# Patient Record
Sex: FEMALE | Race: WHITE | NOT HISPANIC OR LATINO | Employment: OTHER | URBAN - METROPOLITAN AREA
[De-identification: names, ages, dates, MRNs, and addresses within clinical notes are randomized per-mention and may not be internally consistent; named-entity substitution may affect disease eponyms.]

---

## 2017-06-10 ENCOUNTER — HOSPITAL ENCOUNTER (EMERGENCY)
Facility: MEDICAL CENTER | Age: 82
End: 2017-06-10
Attending: EMERGENCY MEDICINE
Payer: COMMERCIAL

## 2017-06-10 VITALS
HEART RATE: 58 BPM | DIASTOLIC BLOOD PRESSURE: 64 MMHG | TEMPERATURE: 98 F | SYSTOLIC BLOOD PRESSURE: 136 MMHG | WEIGHT: 169.75 LBS | BODY MASS INDEX: 28.98 KG/M2 | OXYGEN SATURATION: 97 % | HEIGHT: 64 IN | RESPIRATION RATE: 16 BRPM

## 2017-06-10 DIAGNOSIS — S05.01XA CORNEAL ABRASION, RIGHT, INITIAL ENCOUNTER: ICD-10-CM

## 2017-06-10 PROCEDURE — 99284 EMERGENCY DEPT VISIT MOD MDM: CPT

## 2017-06-10 PROCEDURE — 65205 REMOVE FOREIGN BODY FROM EYE: CPT

## 2017-06-10 PROCEDURE — 700101 HCHG RX REV CODE 250

## 2017-06-10 RX ORDER — GENTAMICIN SULFATE 3 MG/ML
1 SOLUTION/ DROPS OPHTHALMIC EVERY 4 HOURS
Qty: 1 BOTTLE | Refills: 0 | Status: SHIPPED | OUTPATIENT
Start: 2017-06-10 | End: 2017-06-17

## 2017-06-10 RX ORDER — PROPARACAINE HYDROCHLORIDE 5 MG/ML
1 SOLUTION/ DROPS OPHTHALMIC ONCE
Status: DISCONTINUED | OUTPATIENT
Start: 2017-06-10 | End: 2017-06-10 | Stop reason: HOSPADM

## 2017-06-10 RX ORDER — ERYTHROMYCIN 5 MG/G
OINTMENT OPHTHALMIC
Status: COMPLETED
Start: 2017-06-10 | End: 2017-06-10

## 2017-06-10 RX ORDER — TETRACAINE HYDROCHLORIDE 5 MG/ML
SOLUTION OPHTHALMIC
Status: COMPLETED
Start: 2017-06-10 | End: 2017-06-10

## 2017-06-10 RX ADMIN — ERYTHROMYCIN: 5 OINTMENT OPHTHALMIC at 14:30

## 2017-06-10 RX ADMIN — TETRACAINE HYDROCHLORIDE: 5 SOLUTION OPHTHALMIC at 14:30

## 2017-06-10 ASSESSMENT — PAIN SCALES - GENERAL: PAINLEVEL_OUTOF10: 4

## 2017-06-10 NOTE — DISCHARGE INSTRUCTIONS
Use the gentamicin eyedrops during the daytime and the erythromycin cream at bedtime. Call your eye doctor 1st thing Monday morning and arrange office recheck as soon as possible during the week. If you have new or worsening symptoms go to [facility] emergency department on [address] because we do not have an ophthalmologist on call at this hospital

## 2017-06-10 NOTE — ED AVS SNAPSHOT
[employer] Access Code: [email or web address]  Expires: 7/10/2017  2:23 PM    Your email address is not on file at [facility].  Email Addresses are required for you to sign up for [employer], please contact [phone] to verify your personal information and to provide your email address prior to attempting to register for [employer].    Nicko Denisse Dona  [address] [place]    [place] [address]    [employer]  A secure, online tool to manage your health information     [facility] [employer]® is a secure, online tool that connects you to your personalized health information from the privacy of your home -- day or night - making it very easy for you to manage your healthcare. Once the activation process is completed, you can even access your medical information using the [employer] ishmael, which is available for free in the Apple Ishmael store or Google Play store.     To learn more about [employer], visit [email or web address]    There are two levels of access available (as shown below):   My Chart Features  [facility] Primary Care Doctor [facility]  Specialists [facility]  Urgent  Care Non-[facility] Primary Care Doctor   Email your healthcare team securely and privately 24/7 X X X    Manage appointments: schedule your next appointment; view details of past/upcoming appointments X      Request prescription refills. X      View recent personal medical records, including lab and immunizations X X X X   View health record, including health history, allergies, medications X X X X   Read reports about your outpatient visits, procedures, consult and ER notes X X X X   See your discharge summary, which is a recap of your hospital and/or ER visit that includes your diagnosis, lab results, and care plan X X  X     How to register for [employer]:  Once your e-mail address has been verified, follow the following steps to sign up for [employer].     1. Go to  [email or web address]  2. Click on the Sign Up Now box, which takes you to the New Member Sign Up page. You  will need to provide the following information:  a. Enter your [employer] Access Code exactly as it appears at the top of this page. (You will not need to use this code after you’ve completed the sign-up process. If you do not sign up before the expiration date, you must request a new code.)   b. Enter your date of birth.   c. Enter your home email address.   d. Click Submit, and follow the next screen’s instructions.  3. Create a [employer] ID. This will be your [employer] login ID and cannot be changed, so think of one that is secure and easy to remember.  4. Create a [employer] password. You can change your password at any time.  5. Enter your Password Reset Question and Answer. This can be used at a later time if you forget your password.   6. Enter your e-mail address. This allows you to receive e-mail notifications when new information is available in [employer].  7. Click Sign Up. You can now view your health information.    For assistance activating your [employer] account, call [phone]

## 2017-06-10 NOTE — ED AVS SNAPSHOT
Home Care Instructions                                                                                                                Nicko Carcamo   MRN: [id number]    Department:  [facility], Emergency Dept   Date of Visit:  6/10/2017            [facility], Emergency Dept    [address]    [place] [address]    Phone:  [phone]      You were seen by     Mohinder Luna M.D.      Your Diagnosis Was     Corneal abrasion, right, initial encounter     S05.01XA       Medication Information     Review all of your home medications and newly ordered medications with your primary doctor and/or pharmacist as soon as possible. Follow medication instructions as directed by your doctor and/or pharmacist.     Please keep your complete medication list with you and share with your physician. Update the information when medications are discontinued, doses are changed, or new medications (including over-the-counter products) are added; and carry medication information at all times in the event of emergency situations.               Medication List      START taking these medications        Instructions    Morning Afternoon Evening Bedtime    gentamicin 0.3 % Soln   Commonly known as:  GARAMYCIN        Place 1 Drop in right eye every 4 hours for 7 days.   Dose:  1 Drop                          ASK your doctor about these medications        Instructions    Morning Afternoon Evening Bedtime    alprazolam 0.25 MG Tabs   Commonly known as:  XANAX        Take 0.25 mg by mouth at bedtime as needed for Sleep.   Dose:  0.25 mg                        amlodipine 2.5 MG Tabs   Commonly known as:  NORVASC        Take 2.5 mg by mouth every day.   Dose:  2.5 mg                        aspirin 81 MG Chew chewable tablet   Commonly known as:  ASA        Take 81 mg by mouth every day.   Dose:  81 mg                        azithromycin 250 MG Tabs   Commonly known as:  ZITHROMAX        Use  as directed                        ezetimibe 10 MG Tabs   Commonly known as:  ZETIA        Take 10 mg by mouth every day.   Dose:  10 mg                        ferric gluconate complex 12.5 MG/ML Soln   Commonly known as:  FERRLECIT        62.5 mg by Intravenous route Once.   Dose:  62.5 mg                        glipiZIDE 10 MG Tabs   Commonly known as:  GLUCOTROL        Take 10 mg by mouth 2 times a day.   Dose:  10 mg                        losartan 25 MG Tabs   Commonly known as:  COZAAR        Take 25 mg by mouth every day.   Dose:  25 mg                        metoprolol SR 50 MG Tb24   Commonly known as:  TOPROL XL        Take 50 mg by mouth 2 Times a Day.   Dose:  50 mg                        sitagliptin 100 MG Tabs   Commonly known as:  JANUVIA        Take 100 mg by mouth every day.   Dose:  100 mg                        SPIRONOLACTONE PO        Take 12.5 mg by mouth.   Dose:  12.5 mg                             Where to Get Your Medications      You can get these medications from any pharmacy     Bring a paper prescription for each of these medications    - gentamicin 0.3 % Soln              Discharge Instructions       Use the gentamicin eyedrops during the daytime and the erythromycin cream at bedtime. Call your eye doctor 1st thing Monday morning and arrange office recheck as soon as possible during the week. If you have new or worsening symptoms go to [facility] emergency department on [address] because we do not have an ophthalmologist on call at this hospital          Patient Information     Patient Information    Following emergency treatment: all patient requiring follow-up care must return either to a private physician or a clinic if your condition worsens before you are able to obtain further medical attention, please return to the emergency room.     Billing Information    At [facility], we work to make the billing process streamlined for our patients.  Our  Representatives are here to answer any questions you may have regarding your hospital bill.  If you have insurance coverage and have supplied your insurance information to us, we will submit a claim to your insurer on your behalf.  Should you have any questions regarding your bill, we can be reached online or by phone as follows:  Online: You are able pay your bills online or live chat with our representatives about any billing questions you may have. We are here to help Monday - Friday from 8:00am to 7:30pm and 9:00am - 12:00pm on Saturdays.  Please visit [email or web address]  for more information.   Phone:  [phone] or [phone]    Please note that your emergency physician, surgeon, pathologist, radiologist, anesthesiologist, and other specialists are not employed by [facility] and will therefore bill separately for their services.  Please contact them directly for any questions concerning their bills at the numbers below:     Emergency Physician Services:  [phone]  [facility]:  [phone]  [facility]:  [phone]  [facility]:  [phone]    1. Your final bill may vary from the amount quoted upon discharge if all procedures are not complete at that time, or if your doctor has additional procedures of which we are not aware. You will receive an additional bill if you return to the Emergency Department at [facility] for suture removal regardless of the facility of which the sutures were placed.     2. Please arrange for settlement of this account at the emergency registration.    3. All self-pay accounts are due in full at the time of treatment.  If you are unable to meet this obligation then payment is expected within 4-5 days.     4. If you have had radiology studies (CT, X-ray, Ultrasound, MRI), you have received a preliminary result during your emergency department visit. Please contact the radiology  department [phone] to receive a copy of your final result. Please discuss the Final result with your primary physician or with the follow up physician provided.     Crisis Hotline:  [place] Crisis Hotline:  8-922-NMHOCIW or 1-184.382.8975  [place] Crisis Hotline:    1-918.104.7807 or 753-428-7937         ED Discharge Follow Up Questions    1. In order to provide you with very good care, we would like to follow up with a phone call in the next few days.  May we have your permission to contact you?     YES /  NO    2. What is the best phone number to call you? (       )_____-__________    3. What is the best time to call you?      Morning  /  Afternoon  /  Evening                   Patient Signature:  ____________________________________________________________    Date:  ____________________________________________________________

## 2017-06-10 NOTE — ED NOTES
DC instructions and prescription x1 given to pt. Verbalized understanding. Pt steady on feet with 0 s/s distress noted. Pt dcd home with son.

## 2017-06-10 NOTE — ED PROVIDER NOTES
ED Provider Note    CHIEF COMPLAINT  No chief complaint on file.      HPI  Nicko Carcamo is a 89 y.o. female who presents to the emergency department complaining of irritation of the right eye. The patient says she woke up this morning in the right eye felt irritated she thought maybe she got something in her eye but doesn't recall any specific event. There was some discharge in the eye this morning, she has been rubbing it and thinks maybe she scratched it. She has not noticed any changes in her visual acuity and the left eye is not involved    REVIEW OF SYSTEMS no cough or runny nose fever or chills he does not recall any trauma or foreign body exposure.    PAST MEDICAL HISTORY  Past Medical History   Diagnosis Date   • Diabetes (CMS-[facility])    • Hypertension        FAMILY HISTORY  History reviewed. No pertinent family history.    SOCIAL HISTORY  Social History     Social History   • Marital Status:      Spouse Name: N/A   • Number of Children: N/A   • Years of Education: N/A     Social History Main Topics   • Smoking status: Former Smoker   • Smokeless tobacco: Never Used   • Alcohol Use: Yes      Comment: Occasionally   • Drug Use: No   • Sexual Activity: Not Asked     Other Topics Concern   • None     Social History Narrative       SURGICAL HISTORY  History reviewed. No pertinent past surgical history.    CURRENT MEDICATIONS  Home Medications     **Home medications have not yet been reviewed for this encounter**          ALLERGIES  Allergies   Allergen Reactions   • Actos [Pioglitazone]    • Atacand [Candesartan]    • Avapro [Irbesartan]    • Clindamycin    • Epinephrine    • Hydrocodone    • Lipitor [Atorvastatin]    • Micardis [Telmisartan]    • Microzide [Hydrochlorothiazide]    • Pcn [Penicillins]    • Vaseretic [Enalapril-Hydrochlorothiazide]    • Vioxx [Rofecoxib]    • Zestril [Lisinopril]        PHYSICAL EXAM  VITAL SIGNS: /66 mmHg  Pulse 70  Temp(Src) 36.7 °C (98 °F)  Resp 18  Ht 1.626  "m (5' 4\")  Wt 77 kg (169 lb 12.1 oz)  BMI 29.12 kg/m2  SpO2 92%   Oxygen saturation is interpreted as adequate  Constitutional: Awake and pleasant individual in no distress  Eyes: The right eye is slightly injected and minimally chemotic. I did not see any embedded foreign bodies. I do not see any foreign bodies under the upper or lower lid. There was however an eyelash on the upper lid that was going downward and rubbing over the surface of the eye and this had a small pustule at its base. With fluoroscene seen sustaining there was an area of uptake noted at the 5-6 position at the lower edge of the iris. This did not look like an ulceration. Intraocular pressure was measured with a Benedict-Pen at 18 in the right eye and the patient says that her optometrist, Dr. Dozier has been seeing her every 6 months to monitor her eye pressure. Visual acuity is 20/50 wearing her eyeglasses both left and right and with both eyes open 20/40     PROCEDURES  Tetracaine eyedrops were placed in the patient's eye and this gave her very good relief of her symptoms examination was performed as described above. Also used a small tweezer to remove the eyelash that was growing downward into her eye and this came out very easily and had a small pustule at its base.    MEDICAL DECISION MAKING and DISPOSITION  At this point in time I think he'll be safe for the patient to follow up with her eye doctor on an outpatient basis, she sees Dr. Dozier. She is instructed to call the office 1st thing Monday morning and arrange office recheck during the week and I have given her a small tube of erythromycin ointment to use at bedtime for the next 7 days and a prescription for gentamicin ophthalmic drops to use during the daytime. If she experiences new or worsening symptoms she is to go directly to [facility] emergency department on [address] for recheck because we do not have an ophthalmologist on call at this " hospital    IMPRESSION  1. Corneal abrasion, right eye         Electronically signed by: Mohinder Luna, 6/10/2017 2:20 PM

## 2017-06-10 NOTE — ED AVS SNAPSHOT
6/10/2017    Nicko Carcamo  [address]  [place] [address]    Dear Nicko:    [facility] wants to ensure your discharge home is safe and you or your loved ones have had all of your questions answered regarding your care after you leave the hospital.    Below is a list of resources and contact information should you have any questions regarding your hospital stay, follow-up instructions, or active medical symptoms.    Questions or Concerns Regarding… Contact   Medical Questions Related to Your Discharge  (7 days a week, 8am-5pm) Contact a Nurse Care Coordinator   [phone]   Medical Questions Not Related to Your Discharge  (24 hours a day / 7 days a week)  Contact the Nurse Health Line   [phone]    Medications or Discharge Instructions Refer to your discharge packet   or contact your [facility] Primary Care Provider   [phone]   Follow-up Appointment(s) Schedule your appointment via [employer]   or contact Scheduling [phone]   Billing Review your statement via [employer]  or contact Billing [phone]   Medical Records Review your records via [employer]   or contact Medical Records [phone]     You may receive a telephone call within two days of discharge. This call is to make certain you understand your discharge instructions and have the opportunity to have any questions answered. You can also easily access your medical information, test results and upcoming appointments via the [employer] free online health management tool. You can learn more and sign up at Program[employer]ifts. For assistance setting up your [employer] account, please call [phone].    Once again, we want to ensure your discharge home is safe and that you have a clear understanding of any next steps in your care. If you have any questions or concerns, please do not hesitate to contact us, we are here for you. Thank you for choosing [facility] for your healthcare needs.    Sincerely,    Your [facility] Healthcare Team

## 2021-01-14 DIAGNOSIS — Z23 NEED FOR VACCINATION: ICD-10-CM
